# Patient Record
Sex: MALE | Race: WHITE | NOT HISPANIC OR LATINO | Employment: UNEMPLOYED | URBAN - METROPOLITAN AREA
[De-identification: names, ages, dates, MRNs, and addresses within clinical notes are randomized per-mention and may not be internally consistent; named-entity substitution may affect disease eponyms.]

---

## 2023-05-30 PROBLEM — R59.1 LYMPHADENOPATHY: Status: ACTIVE | Noted: 2023-05-30

## 2023-06-17 PROBLEM — E11.9 DIABETES MELLITUS: Status: ACTIVE | Noted: 2023-06-17

## 2023-06-17 PROBLEM — L03.90 CELLULITIS: Status: ACTIVE | Noted: 2023-06-17

## 2023-06-17 PROBLEM — E11.65 TYPE 2 DIABETES MELLITUS WITH HYPERGLYCEMIA, WITHOUT LONG-TERM CURRENT USE OF INSULIN: Status: ACTIVE | Noted: 2023-06-17

## 2023-06-17 PROBLEM — C85.90 T-CELL LYMPHOMA: Status: ACTIVE | Noted: 2023-06-17

## 2023-06-17 PROBLEM — N40.1 BENIGN PROSTATIC HYPERPLASIA WITH URINARY OBSTRUCTION: Status: ACTIVE | Noted: 2023-06-17

## 2023-06-17 PROBLEM — Z86.73 HISTORY OF STROKE: Status: ACTIVE | Noted: 2023-06-17

## 2023-06-17 PROBLEM — N13.8 BENIGN PROSTATIC HYPERPLASIA WITH URINARY OBSTRUCTION: Status: ACTIVE | Noted: 2023-06-17

## 2023-07-19 PROBLEM — T81.49XA POSTOPERATIVE WOUND INFECTION: Status: ACTIVE | Noted: 2023-07-19

## 2023-08-10 PROBLEM — R73.9 HYPERGLYCEMIA: Status: ACTIVE | Noted: 2023-08-10

## 2023-08-11 PROBLEM — R97.20 HIGH PROSTATE SPECIFIC ANTIGEN (PSA): Status: ACTIVE | Noted: 2020-03-18

## 2023-08-21 ENCOUNTER — PATIENT OUTREACH (OUTPATIENT)
Dept: ADMINISTRATIVE | Facility: OTHER | Age: 68
End: 2023-08-21

## 2023-08-21 ENCOUNTER — TELEPHONE (OUTPATIENT)
Dept: ADMINISTRATIVE | Facility: CLINIC | Age: 68
End: 2023-08-21

## 2023-08-21 NOTE — PROGRESS NOTES
CHW - Case Closure    This Community Health Worker spoke to patient via telephone today.   Pt/Caregiver reported: Pt states he did not respond to text needing resources.  Pt/Caregiver denied any additional needs at this time and agrees with episode closure at this time.  Provided patient with Community Health Worker's contact information and encouraged him/her to contact this Community Health Worker if additional needs arise.

## 2024-04-26 PROBLEM — C91.60: Status: ACTIVE | Noted: 2024-04-26

## 2024-04-26 PROBLEM — E05.90 THYROTOXICOSIS WITHOUT THYROID STORM: Status: ACTIVE | Noted: 2024-04-26

## 2024-05-02 PROBLEM — C83.50: Status: ACTIVE | Noted: 2023-06-17

## 2024-05-03 PROBLEM — E83.42 HYPOMAGNESEMIA: Status: ACTIVE | Noted: 2024-05-03

## 2024-05-05 PROBLEM — R41.82 AMS (ALTERED MENTAL STATUS): Status: ACTIVE | Noted: 2024-05-05

## 2024-05-06 PROBLEM — G93.40 ACUTE ENCEPHALOPATHY: Status: ACTIVE | Noted: 2024-05-06

## 2024-11-11 PROBLEM — R07.9 CHEST PAIN: Status: ACTIVE | Noted: 2024-11-11

## 2024-11-11 PROBLEM — E87.20 METABOLIC ACIDOSIS: Status: ACTIVE | Noted: 2024-11-11

## 2024-11-11 PROBLEM — N18.32 STAGE 3B CHRONIC KIDNEY DISEASE: Status: ACTIVE | Noted: 2024-11-11

## 2024-11-11 PROBLEM — E11.21 DIABETIC NEPHROPATHY ASSOCIATED WITH TYPE 2 DIABETES MELLITUS: Status: ACTIVE | Noted: 2024-11-11

## 2024-11-11 PROBLEM — N17.9 AKI (ACUTE KIDNEY INJURY): Status: ACTIVE | Noted: 2024-11-11

## 2024-11-11 PROBLEM — R80.9 PROTEINURIA: Status: ACTIVE | Noted: 2024-11-11

## 2024-11-12 PROBLEM — E11.65 HYPERGLYCEMIA DUE TO DIABETES MELLITUS: Status: ACTIVE | Noted: 2024-11-12

## 2024-12-04 PROBLEM — N50.89 SCROTAL EDEMA: Status: ACTIVE | Noted: 2024-12-04

## 2024-12-09 PROBLEM — R80.9 PROTEINURIA: Status: RESOLVED | Noted: 2024-11-11 | Resolved: 2024-12-09

## 2024-12-09 PROBLEM — N17.9 AKI (ACUTE KIDNEY INJURY): Status: RESOLVED | Noted: 2024-11-11 | Resolved: 2024-12-09

## 2024-12-09 PROBLEM — E11.21 DIABETIC NEPHROPATHY ASSOCIATED WITH TYPE 2 DIABETES MELLITUS: Status: RESOLVED | Noted: 2024-11-11 | Resolved: 2024-12-09

## 2024-12-09 PROBLEM — R07.9 CHEST PAIN: Status: RESOLVED | Noted: 2024-11-11 | Resolved: 2024-12-09

## 2024-12-09 PROBLEM — E87.20 METABOLIC ACIDOSIS: Status: RESOLVED | Noted: 2024-11-11 | Resolved: 2024-12-09

## 2024-12-12 PROBLEM — D64.9 ANEMIA: Status: ACTIVE | Noted: 2024-12-12

## 2024-12-12 PROBLEM — E03.5 MYXEDEMA COMA: Status: ACTIVE | Noted: 2024-12-12

## 2024-12-12 PROBLEM — D69.6 THROMBOCYTOPENIA: Status: ACTIVE | Noted: 2024-12-12

## 2024-12-12 PROBLEM — N45.1 EPIDIDYMITIS: Status: ACTIVE | Noted: 2024-12-12

## 2024-12-15 PROBLEM — N18.30 CKD STAGE 3 DUE TO TYPE 2 DIABETES MELLITUS: Status: ACTIVE | Noted: 2024-12-15

## 2024-12-15 PROBLEM — E11.9 TYPE 2 DIABETES MELLITUS: Status: ACTIVE | Noted: 2024-12-15

## 2024-12-15 PROBLEM — R60.9 PITTING EDEMA: Status: ACTIVE | Noted: 2024-12-15

## 2024-12-15 PROBLEM — B37.49 CANDIDURIA: Status: ACTIVE | Noted: 2024-12-15

## 2024-12-15 PROBLEM — E11.22 CKD STAGE 3 DUE TO TYPE 2 DIABETES MELLITUS: Status: ACTIVE | Noted: 2024-12-15

## 2024-12-18 PROBLEM — N17.9 AKI (ACUTE KIDNEY INJURY): Status: ACTIVE | Noted: 2024-12-18

## 2024-12-23 PROBLEM — E89.0 HYPOTHYROIDISM, POSTSURGICAL: Status: ACTIVE | Noted: 2024-12-23

## 2024-12-24 PROBLEM — R41.82 AMS (ALTERED MENTAL STATUS): Status: RESOLVED | Noted: 2024-05-05 | Resolved: 2024-12-24

## 2024-12-31 PROBLEM — R41.82 ALTERED MENTAL STATUS: Status: ACTIVE | Noted: 2024-12-31

## 2025-01-04 PROBLEM — F17.200 TOBACCO DEPENDENCY: Status: ACTIVE | Noted: 2025-01-04

## 2025-01-05 PROBLEM — E46 PROTEIN MALNUTRITION: Status: ACTIVE | Noted: 2025-01-05

## 2025-01-05 PROBLEM — R33.9 URINARY RETENTION: Status: ACTIVE | Noted: 2025-01-05

## 2025-01-14 ENCOUNTER — OUTPATIENT CASE MANAGEMENT (OUTPATIENT)
Dept: ADMINISTRATIVE | Facility: OTHER | Age: 70
End: 2025-01-14

## 2025-01-14 NOTE — PROGRESS NOTES
Received office visit from pt sister-in-law on behalf of pt requesting assistance with a gas card. Provided pt sister-in-law with 1/$20.00 gas [phone]) from the [employer] Gas card program to assist with transportation. No other needs were noted at that time. Will continue to follow and assist as needed.       Elsy Stringer, [facility] [place] Management Social Worker  Ext [phone]

## 2025-01-21 ENCOUNTER — OUTPATIENT CASE MANAGEMENT (OUTPATIENT)
Dept: ADMINISTRATIVE | Facility: OTHER | Age: 70
End: 2025-01-21

## 2025-01-21 NOTE — PROGRESS NOTES
Received office visit from pt sister in law requesting assistance with a gas card. Completed the [employer] Application and emailed to TRACI Flores for assistance with mailing gas card to pt home address. No further needs were noted at that time. Will continue to follow and assist as needed.       Elsy Stringer, [facility] [place]  Outpatient Case Management Social Worker  Ext [phone]

## 2025-01-31 PROBLEM — R07.9 CHEST PAIN: Status: ACTIVE | Noted: 2025-01-31

## 2025-01-31 PROBLEM — U07.1 PNEUMONIA DUE TO COVID-19 VIRUS: Status: ACTIVE | Noted: 2025-01-31

## 2025-01-31 PROBLEM — J12.82 PNEUMONIA DUE TO COVID-19 VIRUS: Status: ACTIVE | Noted: 2025-01-31
